# Patient Record
Sex: MALE | Race: WHITE | Employment: FULL TIME | URBAN - METROPOLITAN AREA
[De-identification: names, ages, dates, MRNs, and addresses within clinical notes are randomized per-mention and may not be internally consistent; named-entity substitution may affect disease eponyms.]

---

## 2019-12-10 ENCOUNTER — HOSPITAL ENCOUNTER (OUTPATIENT)
Dept: ULTRASOUND IMAGING | Age: 49
Discharge: HOME | End: 2019-12-10
Attending: FAMILY MEDICINE
Payer: COMMERCIAL

## 2019-12-10 DIAGNOSIS — N50.89 TESTICULAR MASS: ICD-10-CM

## 2019-12-10 PROCEDURE — 76870 US EXAM SCROTUM: CPT

## 2019-12-12 NOTE — PROGRESS NOTES
Message left for pt with results and instructions, per DR Henson's note.  Also sent [employer] message and placed referral.

## 2019-12-12 NOTE — PROGRESS NOTES
Please let know that the mass in his testicle appears to be benign cyst.  I would like him to follow-up with urology for further evaluation and treatment options if needed. Please get him in.